# Patient Record
Sex: FEMALE | Race: WHITE | NOT HISPANIC OR LATINO
[De-identification: names, ages, dates, MRNs, and addresses within clinical notes are randomized per-mention and may not be internally consistent; named-entity substitution may affect disease eponyms.]

---

## 2017-07-17 ENCOUNTER — APPOINTMENT (OUTPATIENT)
Dept: OBGYN | Facility: CLINIC | Age: 37
End: 2017-07-17

## 2017-07-17 VITALS
HEIGHT: 67 IN | SYSTOLIC BLOOD PRESSURE: 126 MMHG | WEIGHT: 224 LBS | DIASTOLIC BLOOD PRESSURE: 85 MMHG | HEART RATE: 78 BPM | BODY MASS INDEX: 35.16 KG/M2

## 2017-07-17 DIAGNOSIS — Z82.49 FAMILY HISTORY OF ISCHEMIC HEART DISEASE AND OTHER DISEASES OF THE CIRCULATORY SYSTEM: ICD-10-CM

## 2017-07-17 DIAGNOSIS — Z78.9 OTHER SPECIFIED HEALTH STATUS: ICD-10-CM

## 2017-07-19 LAB — HPV HIGH+LOW RISK DNA PNL CVX: NEGATIVE

## 2017-07-31 LAB — CYTOLOGY CVX/VAG DOC THIN PREP: NORMAL

## 2018-07-26 ENCOUNTER — APPOINTMENT (OUTPATIENT)
Dept: OBGYN | Facility: CLINIC | Age: 38
End: 2018-07-26
Payer: COMMERCIAL

## 2018-07-26 ENCOUNTER — TRANSCRIPTION ENCOUNTER (OUTPATIENT)
Age: 38
End: 2018-07-26

## 2018-07-26 VITALS
SYSTOLIC BLOOD PRESSURE: 122 MMHG | HEART RATE: 86 BPM | DIASTOLIC BLOOD PRESSURE: 83 MMHG | HEIGHT: 67 IN | WEIGHT: 235 LBS | BODY MASS INDEX: 36.88 KG/M2

## 2018-07-26 DIAGNOSIS — Z86.19 PERSONAL HISTORY OF OTHER INFECTIOUS AND PARASITIC DISEASES: ICD-10-CM

## 2018-07-26 PROCEDURE — 99395 PREV VISIT EST AGE 18-39: CPT

## 2018-08-01 LAB
CYTOLOGY CVX/VAG DOC THIN PREP: NORMAL
HPV HIGH+LOW RISK DNA PNL CVX: NOT DETECTED

## 2018-08-02 ENCOUNTER — APPOINTMENT (OUTPATIENT)
Dept: OBGYN | Facility: CLINIC | Age: 38
End: 2018-08-02

## 2019-08-15 ENCOUNTER — APPOINTMENT (OUTPATIENT)
Dept: OBGYN | Facility: CLINIC | Age: 39
End: 2019-08-15
Payer: COMMERCIAL

## 2019-08-15 VITALS
SYSTOLIC BLOOD PRESSURE: 120 MMHG | HEIGHT: 67 IN | WEIGHT: 218 LBS | BODY MASS INDEX: 34.21 KG/M2 | DIASTOLIC BLOOD PRESSURE: 80 MMHG

## 2019-08-15 DIAGNOSIS — Z97.5 PRESENCE OF (INTRAUTERINE) CONTRACEPTIVE DEVICE: ICD-10-CM

## 2019-08-15 DIAGNOSIS — Z01.419 ENCOUNTER FOR GYNECOLOGICAL EXAMINATION (GENERAL) (ROUTINE) W/OUT ABNORMAL FINDINGS: ICD-10-CM

## 2019-08-15 DIAGNOSIS — Z30.430 ENCOUNTER FOR INSERTION OF INTRAUTERINE CONTRACEPTIVE DEVICE: ICD-10-CM

## 2019-08-15 DIAGNOSIS — Z78.9 OTHER SPECIFIED HEALTH STATUS: ICD-10-CM

## 2019-08-15 PROCEDURE — 99395 PREV VISIT EST AGE 18-39: CPT

## 2019-08-15 NOTE — PHYSICAL EXAM
[Awake] : awake [Alert] : alert [Acute Distress] : no acute distress [LAD] : no lymphadenopathy [Thyroid Nodule] : no thyroid nodule [Goiter] : no goiter [Mass] : no breast mass [Nipple Discharge] : no nipple discharge [Axillary LAD] : no axillary lymphadenopathy [Soft] : soft [Tender] : non tender [Distended] : not distended [H/Smegaly] : no hepatosplenomegaly [Oriented x3] : oriented to person, place, and time [Depressed Mood] : not depressed [Flat Affect] : affect not flat [Labia Majora] : labia major [Labia Minora] : labia minora [Normal] : clitoris [No Bleeding] : there was no active vaginal bleeding [IUD String] : had an IUD string protruding out [Pap Obtained] : a Pap smear was performed [Uterine Adnexae] : were not tender and not enlarged

## 2019-08-15 NOTE — COUNSELING
[Breast Self Exam] : breast self exam [Nutrition] : nutrition [Vitamins/Supplements] : vitamins/supplements [Exercise] : exercise [Contraception] : contraception

## 2019-08-19 LAB — HPV HIGH+LOW RISK DNA PNL CVX: NOT DETECTED

## 2019-08-20 ENCOUNTER — TRANSCRIPTION ENCOUNTER (OUTPATIENT)
Age: 39
End: 2019-08-20

## 2019-08-22 LAB — CYTOLOGY CVX/VAG DOC THIN PREP: NORMAL

## 2020-08-31 ENCOUNTER — APPOINTMENT (OUTPATIENT)
Dept: OBGYN | Facility: CLINIC | Age: 40
End: 2020-08-31
Payer: COMMERCIAL

## 2020-08-31 VITALS
DIASTOLIC BLOOD PRESSURE: 70 MMHG | SYSTOLIC BLOOD PRESSURE: 120 MMHG | HEIGHT: 67 IN | BODY MASS INDEX: 36.88 KG/M2 | WEIGHT: 235 LBS

## 2020-08-31 PROCEDURE — 99396 PREV VISIT EST AGE 40-64: CPT

## 2020-08-31 RX ORDER — MISOPROSTOL 200 UG/1
200 TABLET ORAL
Qty: 2 | Refills: 0 | Status: ACTIVE | COMMUNITY
Start: 2019-08-15 | End: 1900-01-01

## 2020-09-03 LAB — HPV HIGH+LOW RISK DNA PNL CVX: NOT DETECTED

## 2020-09-08 LAB — CYTOLOGY CVX/VAG DOC THIN PREP: NORMAL

## 2020-12-16 PROBLEM — Z86.19 HISTORY OF CANDIDIASIS OF VAGINA: Status: RESOLVED | Noted: 2018-07-26 | Resolved: 2020-12-16

## 2021-10-12 ENCOUNTER — APPOINTMENT (OUTPATIENT)
Dept: OBGYN | Facility: CLINIC | Age: 41
End: 2021-10-12
Payer: COMMERCIAL

## 2021-10-12 VITALS
DIASTOLIC BLOOD PRESSURE: 78 MMHG | SYSTOLIC BLOOD PRESSURE: 110 MMHG | HEIGHT: 67 IN | WEIGHT: 234 LBS | BODY MASS INDEX: 36.73 KG/M2

## 2021-10-12 PROCEDURE — 99396 PREV VISIT EST AGE 40-64: CPT

## 2021-10-14 LAB — HPV HIGH+LOW RISK DNA PNL CVX: NOT DETECTED

## 2021-10-19 ENCOUNTER — APPOINTMENT (OUTPATIENT)
Dept: OBGYN | Facility: CLINIC | Age: 41
End: 2021-10-19
Payer: COMMERCIAL

## 2021-10-19 VITALS
BODY MASS INDEX: 36.73 KG/M2 | HEIGHT: 67 IN | WEIGHT: 234 LBS | SYSTOLIC BLOOD PRESSURE: 110 MMHG | DIASTOLIC BLOOD PRESSURE: 76 MMHG

## 2021-10-19 DIAGNOSIS — Z30.432 ENCOUNTER FOR REMOVAL OF INTRAUTERINE CONTRACEPTIVE DEVICE: ICD-10-CM

## 2021-10-19 PROCEDURE — 58301 REMOVE INTRAUTERINE DEVICE: CPT

## 2021-10-21 LAB — CYTOLOGY CVX/VAG DOC THIN PREP: NORMAL

## 2021-10-29 PROBLEM — Z30.432 ENCOUNTER FOR IUD REMOVAL: Status: ACTIVE | Noted: 2021-10-29

## 2021-10-29 NOTE — PROCEDURE
[IUD Removal] : intrauterine device (IUD) removal [Time out performed] : Pre-procedure time out performed.  Patient's name, date of birth and procedure confirmed. [Consent Obtained] : Consent obtained [ IUD] :  IUD [Risks] : risks [Benefits] : benefits [Alternatives] : alternatives [Patient] : patient [IUD Discarded] : IUD discarded [Sent to Pathology] : specimen was placed in buffered formalin and sent for pathology [Tolerated Well] : Patient tolerated the procedure well [No Complications] : no complications [Heavy Vaginal Bleeding] : for heavy vaginal bleeding [Pelvic Pain] : for pelvic pain

## 2022-03-26 ENCOUNTER — TRANSCRIPTION ENCOUNTER (OUTPATIENT)
Age: 42
End: 2022-03-26

## 2022-12-30 ENCOUNTER — OFFICE (OUTPATIENT)
Dept: URBAN - METROPOLITAN AREA CLINIC 113 | Facility: CLINIC | Age: 42
Setting detail: OPHTHALMOLOGY
End: 2022-12-30
Payer: COMMERCIAL

## 2022-12-30 DIAGNOSIS — H01.004: ICD-10-CM

## 2022-12-30 DIAGNOSIS — H01.001: ICD-10-CM

## 2022-12-30 DIAGNOSIS — H52.4: ICD-10-CM

## 2022-12-30 PROCEDURE — 99203 OFFICE O/P NEW LOW 30 MIN: CPT | Performed by: STUDENT IN AN ORGANIZED HEALTH CARE EDUCATION/TRAINING PROGRAM

## 2022-12-30 PROCEDURE — 92015 DETERMINE REFRACTIVE STATE: CPT | Performed by: STUDENT IN AN ORGANIZED HEALTH CARE EDUCATION/TRAINING PROGRAM

## 2022-12-30 ASSESSMENT — SPHEQUIV_DERIVED
OD_SPHEQUIV: -2
OS_SPHEQUIV: -1.5
OD_SPHEQUIV: -2
OS_SPHEQUIV: -1.5

## 2022-12-30 ASSESSMENT — REFRACTION_AUTOREFRACTION
OS_AXIS: 050
OD_SPHERE: -1.25
OD_CYLINDER: -1.50
OS_CYLINDER: -1.50
OD_AXIS: 111
OS_SPHERE: -0.75

## 2022-12-30 ASSESSMENT — REFRACTION_MANIFEST
OD_ADD: +1.25
OS_CYLINDER: -1.50
OD_AXIS: 111
OS_VA1: 20/20
OD_SPHERE: -1.25
OD_VA1: 20/20
OD_VA2: 20/20
OD_CYLINDER: -1.50
OS_VA2: 20/20
OS_AXIS: 050
OS_ADD: +1.25
OS_SPHERE: -0.75

## 2022-12-30 ASSESSMENT — VISUAL ACUITY
OS_BCVA: 20/20
OD_BCVA: 20/20-1

## 2022-12-30 ASSESSMENT — REFRACTION_CURRENTRX
OS_CYLINDER: -1.00
OD_OVR_VA: 20/
OD_VPRISM_DIRECTION: SV
OS_OVR_VA: 20/
OS_VPRISM_DIRECTION: SV
OS_SPHERE: -0.75
OD_CYLINDER: -1.25
OD_SPHERE: -1.00
OS_AXIS: 041
OD_AXIS: 104

## 2022-12-30 ASSESSMENT — AXIALLENGTH_DERIVED
OS_AL: 23.5337
OD_AL: 23.7757
OD_AL: 23.7757
OS_AL: 23.5337

## 2022-12-30 ASSESSMENT — CONFRONTATIONAL VISUAL FIELD TEST (CVF)
OD_FINDINGS: FULL
OS_FINDINGS: FULL

## 2022-12-30 ASSESSMENT — KERATOMETRY
OD_K1POWER_DIOPTERS: 44.75
OS_K1POWER_DIOPTERS: 44.50
OD_AXISANGLE_DEGREES: 062
OD_K2POWER_DIOPTERS: 45.50
OS_AXISANGLE_DEGREES: 119
OS_K2POWER_DIOPTERS: 46.00

## 2022-12-30 ASSESSMENT — LID EXAM ASSESSMENTS
OD_BLEPHARITIS: RUL T
OS_BLEPHARITIS: LUL T

## 2022-12-30 ASSESSMENT — TONOMETRY: OS_IOP_MMHG: 16

## 2023-04-01 ENCOUNTER — NON-APPOINTMENT (OUTPATIENT)
Age: 43
End: 2023-04-01

## 2023-05-08 ENCOUNTER — NON-APPOINTMENT (OUTPATIENT)
Age: 43
End: 2023-05-08

## 2023-05-08 ENCOUNTER — APPOINTMENT (OUTPATIENT)
Dept: OBGYN | Facility: CLINIC | Age: 43
End: 2023-05-08
Payer: COMMERCIAL

## 2023-05-08 VITALS
WEIGHT: 230 LBS | BODY MASS INDEX: 36.1 KG/M2 | SYSTOLIC BLOOD PRESSURE: 120 MMHG | HEIGHT: 67 IN | DIASTOLIC BLOOD PRESSURE: 80 MMHG

## 2023-05-08 DIAGNOSIS — Z00.00 ENCOUNTER FOR GENERAL ADULT MEDICAL EXAMINATION W/OUT ABNORMAL FINDINGS: ICD-10-CM

## 2023-05-08 PROCEDURE — 99396 PREV VISIT EST AGE 40-64: CPT

## 2023-05-10 LAB — HPV HIGH+LOW RISK DNA PNL CVX: NOT DETECTED

## 2023-05-10 NOTE — HISTORY OF PRESENT ILLNESS
[FreeTextEntry1] : 41 yo pt here for annual exam.\par periods monthly, not crampy or heavy.\par meds-na\par nkda\par no btb, dc. pel pain.\par active , not using contraceptive, using withdrawal. \par not planning to conceive.

## 2023-05-17 LAB — CYTOLOGY CVX/VAG DOC THIN PREP: NORMAL

## 2023-06-14 ENCOUNTER — NON-APPOINTMENT (OUTPATIENT)
Age: 43
End: 2023-06-14

## 2023-10-30 ENCOUNTER — NON-APPOINTMENT (OUTPATIENT)
Age: 43
End: 2023-10-30

## 2024-01-24 ENCOUNTER — NON-APPOINTMENT (OUTPATIENT)
Age: 44
End: 2024-01-24

## 2024-02-24 ENCOUNTER — NON-APPOINTMENT (OUTPATIENT)
Age: 44
End: 2024-02-24

## 2024-07-19 ENCOUNTER — APPOINTMENT (OUTPATIENT)
Dept: OBGYN | Facility: CLINIC | Age: 44
End: 2024-07-19
Payer: COMMERCIAL

## 2024-07-19 VITALS
BODY MASS INDEX: 37.67 KG/M2 | WEIGHT: 240 LBS | HEIGHT: 67 IN | SYSTOLIC BLOOD PRESSURE: 126 MMHG | DIASTOLIC BLOOD PRESSURE: 84 MMHG

## 2024-07-19 DIAGNOSIS — Z01.419 ENCOUNTER FOR GYNECOLOGICAL EXAMINATION (GENERAL) (ROUTINE) W/OUT ABNORMAL FINDINGS: ICD-10-CM

## 2024-07-19 DIAGNOSIS — Z12.39 ENCOUNTER FOR OTHER SCREENING FOR MALIGNANT NEOPLASM OF BREAST: ICD-10-CM

## 2024-07-19 PROCEDURE — 99396 PREV VISIT EST AGE 40-64: CPT

## 2024-07-19 PROCEDURE — 99459 PELVIC EXAMINATION: CPT

## 2024-07-22 LAB — HPV HIGH+LOW RISK DNA PNL CVX: NOT DETECTED

## 2024-07-25 LAB — CYTOLOGY CVX/VAG DOC THIN PREP: NORMAL

## 2025-06-07 ENCOUNTER — NON-APPOINTMENT (OUTPATIENT)
Age: 45
End: 2025-06-07